# Patient Record
Sex: MALE | Race: WHITE | Employment: FULL TIME | URBAN - METROPOLITAN AREA
[De-identification: names, ages, dates, MRNs, and addresses within clinical notes are randomized per-mention and may not be internally consistent; named-entity substitution may affect disease eponyms.]

---

## 2019-07-18 ENCOUNTER — OFFICE VISIT (OUTPATIENT)
Dept: FAMILY MEDICINE | Facility: CLINIC | Age: 54
End: 2019-07-18

## 2019-07-18 VITALS
HEIGHT: 70 IN | TEMPERATURE: 98.2 F | DIASTOLIC BLOOD PRESSURE: 82 MMHG | WEIGHT: 147 LBS | SYSTOLIC BLOOD PRESSURE: 130 MMHG | BODY MASS INDEX: 21.05 KG/M2 | RESPIRATION RATE: 20 BRPM | HEART RATE: 86 BPM | OXYGEN SATURATION: 96 %

## 2019-07-18 DIAGNOSIS — R63.4 WEIGHT LOSS: ICD-10-CM

## 2019-07-18 DIAGNOSIS — Z71.89 ACP (ADVANCE CARE PLANNING): ICD-10-CM

## 2019-07-18 DIAGNOSIS — E11.9 TYPE 2 DIABETES MELLITUS WITHOUT COMPLICATION, WITHOUT LONG-TERM CURRENT USE OF INSULIN (H): ICD-10-CM

## 2019-07-18 DIAGNOSIS — G47.00 INSOMNIA, UNSPECIFIED TYPE: ICD-10-CM

## 2019-07-18 DIAGNOSIS — Z76.89 HEALTH CARE HOME: ICD-10-CM

## 2019-07-18 DIAGNOSIS — Z72.0 TOBACCO USE: ICD-10-CM

## 2019-07-18 DIAGNOSIS — Z01.818 PRE-OPERATIVE EXAMINATION: Primary | ICD-10-CM

## 2019-07-18 DIAGNOSIS — D21.11: ICD-10-CM

## 2019-07-18 DIAGNOSIS — G43.409 HEMIPLEGIC MIGRAINE WITHOUT STATUS MIGRAINOSUS, NOT INTRACTABLE: ICD-10-CM

## 2019-07-18 LAB
ALBUMIN SERPL-MCNC: 4.4 G/DL (ref 3.6–5.1)
ALBUMIN/GLOB SERPL: 1.6 {RATIO} (ref 1–2.5)
ALP SERPL-CCNC: 46 U/L (ref 33–130)
ALT 1742-6: 17 U/L (ref 5–30)
AST 1920-8: 15 U/L (ref 7–31)
BILIRUB SERPL-MCNC: 0.5 MG/DL (ref 0.2–1.2)
BUN SERPL-MCNC: 17 MG/DL (ref 7–25)
BUN/CREATININE RATIO: 17.7 (ref 6–22)
CALCIUM SERPL-MCNC: 9.7 MG/DL (ref 8.6–10.3)
CHLORIDE SERPLBLD-SCNC: 103.9 MMOL/L (ref 98–110)
CHOLEST SERPL-MCNC: 231 MG/DL (ref 0–199)
CHOLEST/HDLC SERPL: 4 {RATIO} (ref 0–5)
CO2 SERPL-SCNC: 24.5 MMOL/L (ref 20–32)
CREAT SERPL-MCNC: 0.96 MG/DL (ref 0.7–1.18)
ERYTHROCYTE [DISTWIDTH] IN BLOOD BY AUTOMATED COUNT: 12.1 %
GLOBULIN, CALCULATED - QUEST: 2.8 (ref 1.9–3.7)
GLUCOSE SERPL-MCNC: 143 MG/DL (ref 60–99)
HBA1C MFR BLD: 6.6 % (ref 4–7)
HCT VFR BLD AUTO: 49.7 % (ref 40–53)
HDLC SERPL-MCNC: 62 MG/DL (ref 40–150)
HEMOGLOBIN: 16.1 G/DL (ref 13.3–17.7)
LDLC SERPL CALC-MCNC: 155 MG/DL (ref 0–130)
MCH RBC QN AUTO: 32.7 PG (ref 26–33)
MCHC RBC AUTO-ENTMCNC: 32.4 G/DL (ref 31–36)
MCV RBC AUTO: 100.9 FL (ref 78–100)
PLATELET COUNT - QUEST: 282 10^9/L (ref 150–375)
POTASSIUM SERPL-SCNC: 4.21 MMOL/L (ref 3.5–5.3)
PROT SERPL-MCNC: 7.2 G/DL (ref 6.1–8.1)
RBC # BLD AUTO: 4.93 10*12/L (ref 4.4–5.9)
SODIUM SERPL-SCNC: 138.1 MMOL/L (ref 135–146)
TRIGL SERPL-MCNC: 72 MG/DL (ref 0–149)
WBC # BLD AUTO: 7.6 10*9/L (ref 4–11)

## 2019-07-18 PROCEDURE — 99203 OFFICE O/P NEW LOW 30 MIN: CPT | Performed by: PHYSICIAN ASSISTANT

## 2019-07-18 PROCEDURE — 93000 ELECTROCARDIOGRAM COMPLETE: CPT | Performed by: PHYSICIAN ASSISTANT

## 2019-07-18 PROCEDURE — 80050 GENERAL HEALTH PANEL: CPT | Mod: 90 | Performed by: PHYSICIAN ASSISTANT

## 2019-07-18 PROCEDURE — 87389 HIV-1 AG W/HIV-1&-2 AB AG IA: CPT | Mod: 90 | Performed by: PHYSICIAN ASSISTANT

## 2019-07-18 PROCEDURE — 36415 COLL VENOUS BLD VENIPUNCTURE: CPT | Performed by: PHYSICIAN ASSISTANT

## 2019-07-18 PROCEDURE — 80061 LIPID PANEL: CPT | Performed by: PHYSICIAN ASSISTANT

## 2019-07-18 PROCEDURE — 83036 HEMOGLOBIN GLYCOSYLATED A1C: CPT | Performed by: PHYSICIAN ASSISTANT

## 2019-07-18 SDOH — HEALTH STABILITY: MENTAL HEALTH: HOW OFTEN DO YOU HAVE A DRINK CONTAINING ALCOHOL?: NEVER

## 2019-07-18 ASSESSMENT — ANXIETY QUESTIONNAIRES
2. NOT BEING ABLE TO STOP OR CONTROL WORRYING: SEVERAL DAYS
3. WORRYING TOO MUCH ABOUT DIFFERENT THINGS: MORE THAN HALF THE DAYS
IF YOU CHECKED OFF ANY PROBLEMS ON THIS QUESTIONNAIRE, HOW DIFFICULT HAVE THESE PROBLEMS MADE IT FOR YOU TO DO YOUR WORK, TAKE CARE OF THINGS AT HOME, OR GET ALONG WITH OTHER PEOPLE: SOMEWHAT DIFFICULT
1. FEELING NERVOUS, ANXIOUS, OR ON EDGE: MORE THAN HALF THE DAYS
GAD7 TOTAL SCORE: 8
5. BEING SO RESTLESS THAT IT IS HARD TO SIT STILL: NOT AT ALL
7. FEELING AFRAID AS IF SOMETHING AWFUL MIGHT HAPPEN: NOT AT ALL
6. BECOMING EASILY ANNOYED OR IRRITABLE: NOT AT ALL

## 2019-07-18 ASSESSMENT — PATIENT HEALTH QUESTIONNAIRE - PHQ9
5. POOR APPETITE OR OVEREATING: NEARLY EVERY DAY
SUM OF ALL RESPONSES TO PHQ QUESTIONS 1-9: 12

## 2019-07-18 ASSESSMENT — MIFFLIN-ST. JEOR: SCORE: 1522.01

## 2019-07-18 NOTE — PROGRESS NOTES
[facility]  [address]  [address]  [facility] [address]  [phone]  Dept: [phone]    PRE-OP EVALUATION:  Today's date: 2019    Ruslan Cervantes (: 1965) presents for pre-operative evaluation assessment as requested by Dr. To.  He requires evaluation and anesthesia risk assessment prior to undergoing surgery/procedure for treatment of tumor growth in right hand ring finger.    Proposed Surgery/ Procedure: Tumor growth removal of right hand ring finger   Date of Surgery/ Procedure:   Time of Surgery/ Procedure: [facility]  Hospital/Surgical Facility: [facility] based on MRI results   Fax number for surgical facility: [facility]  Primary Physician: Evelia Perez  Type of Anesthesia Anticipated: Local, also could change based on MRI results    Patient has a Health Care Directive or Living Will:  NO    1. NO - Do you have a history of heart attack, stroke, stent, bypass or surgery on an artery in the head, neck, heart or legs?  2. NO - Do you ever have any pain or discomfort in your chest?  3. NO - Do you have a history of  Heart Failure?  4. NO - Are you troubled by shortness of breath when: walking on the level, up a slight hill or at night?  5. NO - Do you currently have a cold, bronchitis or other respiratory infection?  6. NO - Do you have a cough, shortness of breath or wheezing?  7. NO - Do you sometimes get pains in the calves of your legs when you walk?  8. NO - Do you or anyone in your family have previous history of blood clots?  9. NO - Do you or does anyone in your family have a serious bleeding problem such as prolonged bleeding following surgeries or cuts?  10. NO - Have you ever had problems with anemia or been told to take iron pills?  11. NO - Have you had any abnormal blood loss such as black, tarry or bloody stools, or abnormal vaginal bleeding?  12. NO - Have you ever had a blood transfusion?  13. NO - Have you or any of your relatives  ever had problems with anesthesia?  14. NO - Do you have sleep apnea, excessive snoring or daytime drowsiness?  15. NO - Do you have any prosthetic heart valves?  16. NO - Do you have prosthetic joints?  17. NO - Is there any chance that you may be pregnant?      HPI:     HPI related to upcoming procedure: Bipin is here today with a tumor on the ring finger of his right hand that has been present for several years. Just recently, the tumor has been getting larger, constant pain, and it is getting in the way of certain activities.     Diabetes: New diagnosis with pre-op exam work-up. Fortunately, A1c immediately controlled with initial A1c of 6.6%. Plan is for diet/lifestyle changes with recheck in 3 months.     See problem list for active medical problems.  See ROS for pertinent symptoms related to these conditions.      MEDICAL HISTORY:     Patient Active Problem List    Diagnosis Date Noted     ACP (advance care planning) 07/18/2019     Priority: Medium     Copy of healthcare directive was given to patient today to fill out and turn back in when finished so that we have on file.          Health Care Home 07/18/2019     Priority: Medium     Diabetes mellitus, type 2 (H) 07/18/2019     Priority: Medium      Past Medical History:   Diagnosis Date     Back injury 2016    broken L4, was broken for years     Past Surgical History:   Procedure Laterality Date     APPENDECTOMY  1983     BACK SURGERY N/A 10/17/2016    L4, L5 and S1 lumbar fusion surgery     HC OPEN TX NOSE FX+OPEN FIX SEPTUM  1983    deviated septum     HC TOOTH EXTRACTION W/FORCEP  1983    all wisdom teeth removed     HERNIA REPAIR  1975     No current outpatient medications on file.     OTC products: Took ibuprofen as recently as 1 week ago.     No Known Allergies     Latex Allergy: NO    Social History     Tobacco Use     Smoking status: Current Every Day Smoker     Packs/day: 1.00     Years: 25.00     Pack years: 25.00     Types: Cigarettes      "Smokeless tobacco: Never Used   Substance Use Topics     Alcohol use: Never     Frequency: Never     Comment: sobriety for 13 years     History   Drug Use     Types: Marijuana     Comment: uses for pain managemet for back pain       REVIEW OF SYSTEMS:   Constitutional, neuro, ENT, endocrine, pulmonary, cardiac, gastrointestinal, genitourinary, musculoskeletal, integument and psychiatric systems are negative, except as otherwise noted.    EXAM:   /82 (BP Location: Right arm, Patient Position: Sitting, Cuff Size: Adult Regular)   Pulse 86   Temp 98.2  F (36.8  C) (Oral)   Resp 20   Ht 1.784 m (5' 10.25\")   Wt 66.7 kg (147 lb)   SpO2 96%   BMI 20.94 kg/m      GENERAL APPEARANCE: healthy, alert and no distress     EYES: EOMI,  PERRL     HENT: ear canals and TM's normal and nose and mouth without ulcers or lesions     NECK: no adenopathy, no asymmetry, masses, or scars and thyroid normal to palpation     RESP: lungs clear to auscultation - no rales, rhonchi or wheezes     CV: regular rates and rhythm, normal S1 S2, no S3 or S4 and no murmur, click or rub     ABDOMEN:  soft, nontender, no HSM or masses and bowel sounds normal     MS: extremities normal- no gross deformities noted, no evidence of inflammation in joints, FROM in all extremities.     SKIN: no suspicious lesions or rashes     NEURO: Normal strength and tone, sensory exam grossly normal, mentation intact and speech normal     PSYCH: mentation appears normal. and affect normal/bright     LYMPHATICS: No cervical adenopathy    DIAGNOSTICS:   EKG (attached): sinus bradycardia, normal axis, normal intervals, no acute ST/T changes c/w ischemia, no LVH by voltage criteria, unchanged from previous tracings  Hemoglobin (indicated for history of anemia or procedure with significant blood loss such as tonsillectomy, major intraperitoneal surgery, vascular surgery, major spine surgery, total joint replacement)    Office Visit on 07/18/2019   Component Date " Value Ref Range Status     WBC 07/18/2019 7.6  4.0 - 11 10*9/L Final     RBC Count 07/18/2019 4.93  4.4 - 5.9 10*12/L Final     Hemoglobin 07/18/2019 16.1  13.3 - 17.7 g/dL Final     Hematocrit 07/18/2019 49.7  40.0 - 53.0 % Final     MCV 07/18/2019 100.9* 78 - 100 fL Final     MCH 07/18/2019 32.7  26 - 33 pg Final     MCHC 07/18/2019 32.4  31 - 36 g/dL Final     RDW 07/18/2019 12.1  % Final     Platelet Count 07/18/2019 282  150 - 375 10^9/L Final     TSH 07/18/2019 2.50  0.40 - 4.50 mIU/L Final     HIV 1/2 Agn Mary 4th Gen With Reflex 07/18/2019 NON-REACTIVE  NON-REACTIVE Final    Comment: HIV-1 antigen and HIV-1/HIV-2 antibodies were not  detected. There is no laboratory evidence of HIV  infection.     PLEASE NOTE: This information has been disclosed to  you from records whose confidentiality may be  protected by state law.  If your state requires such  protection, then the state law prohibits you from  making any further disclosure of the information  without the specific written consent of the person  to whom it pertains, or as otherwise permitted by law.  A general authorization for the release of medical or  other information is NOT sufficient for this purpose.      For additional information please refer to  http://education.[employer].BuyerMLS/faq/POW076  (This link is being provided for informational/  educational purposes only.)        The performance of this assay has not been clinically  validated in patients less than 2 years old.          Hemoglobin A1C 07/18/2019 6.6  4.0 - 7.0 % Final     Cholesterol 07/18/2019 231* 0 - 199 mg/dL Final     Triglycerides 07/18/2019 72  0 - 149 mg/dL Final     HDL Cholesterol 07/18/2019 62  40 - 150 mg/dL Final     LDL Cholesterol Direct 07/18/2019 155* 0 - 130 mg/dL Final     Cholesterol/HDL Ratio 07/18/2019 4  0 - 5 Final     Carbon Dioxide 07/18/2019 24.5  20 - 32 mmol/L Final     Creatinine 07/18/2019 0.96  0.70 - 1.18 mg/dL Final     Glucose 07/18/2019 143* 60 -  99 mg/dL Final     Sodium 07/18/2019 138.1  135 - 146 mmol/L Final     Potassium 07/18/2019 4.21  3.5 - 5.3 mmol/L Final     Chloride 07/18/2019 103.9  98 - 110 mmol/L Final     Protein Total 07/18/2019 7.2  6.1 - 8.1 g/dL Final     Albumin 07/18/2019 4.4  3.6 - 5.1 g/dL Final     Alkaline Phosphatase 07/18/2019 46  33 - 130 U/L Final     ALT 07/18/2019 17  5 - 30 U/L Final     AST 07/18/2019 15  7 - 31 U/L Final     Bilirubin Total 07/18/2019 0.5  0.2 - 1.2 mg/dL Final     Urea Nitrogen 07/18/2019 17  7 - 25 mg/dL Final     Calcium 07/18/2019 9.7  8.6 - 10.3 mg/dL Final     BUN/Creatinine Ratio 07/18/2019 17.7  6 - 22 Final     Globulin Calculated 07/18/2019 2.8  1.9 - 3.7 Final     A/G Ratio 07/18/2019 1.6  1 - 2.5 Final     No immediate concerns with lab findings, including new dx DM2.    IMPRESSION:   Reason for surgery/procedure: Right hand, ring finger tumor.  Diagnosis/reason for consult: Pre-operative examination.    The proposed surgical procedure is considered INTERMEDIATE risk.     REVISED CARDIAC RISK INDEX  The patient has the following serious cardiovascular risks for perioperative complications such as (MI, PE, VFib and 3  AV Block):  No serious cardiac risks  INTERPRETATION: 1 risks: Class II (low risk - 0.9% complication rate)    The patient has the following additional risks for perioperative complications:  No identified additional risks      ICD-10-CM    1. Pre-operative examination Z01.818 VENOUS COLLECTION     HEMOGRAM/PLATELET ([facility])     EKG 12-lead complete w/read - Clinics     CANCELED: Basic Metabolic Panel ([facility])   2. 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 Insomnia, unspecified type G47.00    4. Tobacco use Z72.0    5. Weight loss R63.4 VENOUS COLLECTION     TSH with free T4 reflex ([facility])     HIV 1/2 Agn Mary 4th Gen w Reflex  ([facility])     Hemoglobin A1c ([facility])   6. Hemiplegic migraine without status migrainosus, not intractable G43.409    7. ACP (advance care planning) Z71.89    8. Health Care Home Z76.89    9. Type 2 diabetes mellitus without complication, without long-term current use of insulin (H) E11.9 Lipid Panel ([facility])     Comprehensive Metobolic Panel ([facility])       RECOMMENDATIONS:     --Patient is to take all scheduled medications on the day of surgery EXCEPT for modifications listed below.    APPROVAL GIVEN to proceed with proposed procedure, without further diagnostic evaluation     1. Seven days before surgery do not take Aspirin or any over-the-counter pain medications other than Tylenol.  TYLENOL is the safest pain pill to use before surgery because it does not affect your bleeding time. If tylenol is not sufficient for pain control talk to me or the surgeon and we will decide what is safe to use.    2. Do not eat anything after midnight  (kathleen of the surgery) and nothing the morning of the surgery.    3. Medications: Does not take any daily medications.      4. Follow all instructions given by the surgery team. They usually give out a packet. Read it and please follow it precisely. This helps surgical experience and outcomes.    5. If you have any questions do not hesitate to call me or the surgeon/surgical team.      Evelia Perez PA-C  [facility]          Signed Electronically by: Evelia Perez PA-C    Copy of this evaluation report is provided to requesting physician.    [facility] Preop Guidelines    Revised Cardiac Risk Index

## 2019-07-18 NOTE — NURSING NOTE
Chief Complaint   Patient presents with     Pre-Op Exam     DOS 7/22, right ring finger tumor removal, Dr. To, [facility]

## 2019-07-19 ENCOUNTER — TELEPHONE (OUTPATIENT)
Dept: FAMILY MEDICINE | Facility: CLINIC | Age: 54
End: 2019-07-19

## 2019-07-19 LAB
HIV 1/2 AGN ABY 4TH GEN WITH REFLEX: NORMAL
TSH SERPL-ACNC: 2.5 MIU/L (ref 0.4–4.5)

## 2019-07-19 ASSESSMENT — ANXIETY QUESTIONNAIRES: GAD7 TOTAL SCORE: 8

## 2019-07-31 ENCOUNTER — HOSPITAL PATHOLOGY (OUTPATIENT)
Dept: OTHER | Facility: CLINIC | Age: 54
End: 2019-07-31

## 2019-08-02 LAB — COPATH REPORT: NORMAL

## 2019-08-15 ENCOUNTER — OFFICE VISIT (OUTPATIENT)
Dept: FAMILY MEDICINE | Facility: CLINIC | Age: 54
End: 2019-08-15

## 2019-08-15 VITALS
OXYGEN SATURATION: 98 % | DIASTOLIC BLOOD PRESSURE: 64 MMHG | HEART RATE: 82 BPM | WEIGHT: 149 LBS | HEIGHT: 70 IN | SYSTOLIC BLOOD PRESSURE: 118 MMHG | BODY MASS INDEX: 21.33 KG/M2

## 2019-08-15 DIAGNOSIS — F10.21 HISTORY OF ALCOHOLISM (H): ICD-10-CM

## 2019-08-15 DIAGNOSIS — Z12.11 SPECIAL SCREENING FOR MALIGNANT NEOPLASMS, COLON: ICD-10-CM

## 2019-08-15 DIAGNOSIS — N52.9 ERECTILE DYSFUNCTION, UNSPECIFIED ERECTILE DYSFUNCTION TYPE: ICD-10-CM

## 2019-08-15 DIAGNOSIS — E11.9 TYPE 2 DIABETES MELLITUS WITHOUT COMPLICATION, WITHOUT LONG-TERM CURRENT USE OF INSULIN (H): Primary | ICD-10-CM

## 2019-08-15 PROCEDURE — 90715 TDAP VACCINE 7 YRS/> IM: CPT | Performed by: PHYSICIAN ASSISTANT

## 2019-08-15 PROCEDURE — 99213 OFFICE O/P EST LOW 20 MIN: CPT | Mod: 25 | Performed by: PHYSICIAN ASSISTANT

## 2019-08-15 PROCEDURE — 90471 IMMUNIZATION ADMIN: CPT | Performed by: PHYSICIAN ASSISTANT

## 2019-08-15 RX ORDER — METFORMIN HCL 500 MG
500 TABLET, EXTENDED RELEASE 24 HR ORAL
Qty: 30 TABLET | Refills: 2 | Status: SHIPPED | OUTPATIENT
Start: 2019-08-15

## 2019-08-15 RX ORDER — LISINOPRIL 5 MG/1
5 TABLET ORAL DAILY
Qty: 30 TABLET | Refills: 2 | Status: SHIPPED | OUTPATIENT
Start: 2019-08-15

## 2019-08-15 RX ORDER — HYDROCODONE BITARTRATE AND ACETAMINOPHEN 5; 325 MG/1; MG/1
TABLET ORAL
Refills: 0 | COMMUNITY
Start: 2019-07-31 | End: 2019-09-26

## 2019-08-15 RX ORDER — SILDENAFIL 25 MG/1
TABLET, FILM COATED ORAL
Qty: 20 TABLET | Refills: 1 | Status: SHIPPED | OUTPATIENT
Start: 2019-08-15 | End: 2019-08-15

## 2019-08-15 RX ORDER — ATORVASTATIN CALCIUM 10 MG/1
10 TABLET, FILM COATED ORAL DAILY
Qty: 30 TABLET | Refills: 2 | Status: SHIPPED | OUTPATIENT
Start: 2019-08-15

## 2019-08-15 RX ORDER — SILDENAFIL 25 MG/1
TABLET, FILM COATED ORAL
Qty: 20 TABLET | Refills: 1 | Status: SHIPPED | OUTPATIENT
Start: 2019-08-15

## 2019-08-15 ASSESSMENT — PATIENT HEALTH QUESTIONNAIRE - PHQ9
5. POOR APPETITE OR OVEREATING: NEARLY EVERY DAY
SUM OF ALL RESPONSES TO PHQ QUESTIONS 1-9: 19

## 2019-08-15 ASSESSMENT — ANXIETY QUESTIONNAIRES
GAD7 TOTAL SCORE: 19
5. BEING SO RESTLESS THAT IT IS HARD TO SIT STILL: MORE THAN HALF THE DAYS
2. NOT BEING ABLE TO STOP OR CONTROL WORRYING: NEARLY EVERY DAY
7. FEELING AFRAID AS IF SOMETHING AWFUL MIGHT HAPPEN: MORE THAN HALF THE DAYS
1. FEELING NERVOUS, ANXIOUS, OR ON EDGE: NEARLY EVERY DAY
6. BECOMING EASILY ANNOYED OR IRRITABLE: NEARLY EVERY DAY
IF YOU CHECKED OFF ANY PROBLEMS ON THIS QUESTIONNAIRE, HOW DIFFICULT HAVE THESE PROBLEMS MADE IT FOR YOU TO DO YOUR WORK, TAKE CARE OF THINGS AT HOME, OR GET ALONG WITH OTHER PEOPLE: EXTREMELY DIFFICULT
3. WORRYING TOO MUCH ABOUT DIFFERENT THINGS: NEARLY EVERY DAY

## 2019-08-15 ASSESSMENT — MIFFLIN-ST. JEOR: SCORE: 1531.08

## 2019-08-15 NOTE — NURSING NOTE
Bipin is here for recheck meds and go over lab work.          Pre-visit Screening:  Immunizations:  up to date  Colonoscopy:  is due and to be scheduled by patient for later completion  Mammogram: NA  Asthma Action Test/Plan:  NA  PHQ9:  Done today  GAD7:  Done today  Questioned patient about current smoking habits Pt. smokes 20 cigerettes a day  Ok to leave detailed message on voice mail for today's visit only Yes, phone # [phone]

## 2019-08-15 NOTE — PATIENT INSTRUCTIONS
New diagnosis diabetes.   Recommended start 1 tablet of metformin with dinner for 1 week. Monitor for side effects (upset stomach, nausea,   If doing okay on this, at that time start taking lisinopril (kidney protective) once daily for 1 week. Monitor for side effects (cough).  If doing okay on this, at that time, start taking atorvastatin 1 tablet daily at bedtime. Monitor for side effects (muscle aches, joint pains).     For mental health, I am concerned that you have both anxiety and depression. Most importantly, you are safe- not having any thoughts of better off not here or worried that you would hurt yourself or others. I would like to have you pursue meeting with a therapist, then return in 1 month after starting the above medications, and at that time we could consider adding a mental health medication. However, if things worsen in the meantime, I want to see you immediately, or contact ER.     [facility], [place] [address] [address]  [address]  [phone]    Counseling Care  [address] [address]  [phone] -- appt line    [facility]  [address] [address]  [phone]  [email or web address]    For left arm/hand symptoms (pain, cramping, numbness)- pay attention to whether it involves all your finger, or if doesn't involve your pinky. We can discuss this more at a later.    ED- try [facility] for cost savings

## 2019-08-15 NOTE — PROGRESS NOTES
"CC: New dx diabetes, mental health, several concerns.    History:  Bipin is here today to follow-up as he was found to have an elevated A1c of 6.6% at new patient pre-op 7/31/2019. He was not started on any medications at that time, as I didn't want to risk any changes prior to his surgery.    Now he returns wanting to address this, as well as several other issues he would like to discuss. Warned him that we will not be able to address all issues, given this was a 15 minute that was planned to be spent discussing new dx diabetes.     DM2:  Not taking any medications yet, but has been being much more careful of sugar/carbohydrate intake.     Depression/anxiety: Bipin also brings up his feeings of low mood, but fortunately no SI/HI. His mother has a history of depression, and he tends to have similar health things to her. He also has anxiety symptoms. Is having trouble sleeping where he sleeps 2-4 hours per night. Has tried herbal tea. Fortunately, no SI/HI, feels safe.     ED: Has struggled with symptoms of ED for 3-5 years. Would like to try medication for this.    Yellow toe nails: Has tried over the counter solution that helped somewhat, but now can't find it anywhere.    Left arm, forearm, hand pain/spasm    PMH, MEDICATIONS, ALLERGIES, SOCIAL AND FAMILY HISTORY in EPIC and reviewed by me personally.    ROS negative other than the symptoms noted above in the HPI.      Examination   /64 (BP Location: Right arm, Patient Position: Sitting, Cuff Size: Adult Regular)   Pulse 82   Ht 1.784 m (5' 10.25\")   Wt 67.6 kg (149 lb)   SpO2 98%   BMI 21.23 kg/m         Constitutional: Sitting comfortably, in no acute distress. Vital signs noted  Eyes: pupils equal round reactive to light and accomodation, extra ocular movements intact  Neck:  no adenopathy, trachea midline and normal to palpation  Cardiovascular:  regular rate and rhythm, no murmurs, clicks, or gallops  Respiratory:  normal respiratory rate and " rhythm, lungs clear to auscultation  SKIN: No jaundice/pallor/rash.   Psychiatric: mentation appears normal and affect normal/bright        A/P    ICD-10-CM    1. Type 2 diabetes mellitus without complication, without long-term current use of insulin (H) E11.9 C FOOT EXAM  NO CHARGE     metFORMIN (GLUCOPHAGE-XR) 500 MG 24 hr tablet     atorvastatin (LIPITOR) 10 MG tablet     lisinopril (PRINIVIL/ZESTRIL) 5 MG tablet   2. Special screening for malignant neoplasms, colon Z12.11 GASTROENTEROLOGY ADULT REF PROCEDURE ONLY Other; [place] [facility] [phone]   3. History of alcoholism (H) F10.21    4. Erectile dysfunction, unspecified erectile dysfunction type N52.9 sildenafil (VIAGRA) 25 MG tablet     DISCONTINUED: sildenafil (VIAGRA) 25 MG tablet       DISCUSSION:  New diagnosis diabetes.   Recommended start 1 tablet of metformin with dinner for 1 week. Monitor for side effects (upset stomach, nausea,   If doing okay on this, at that time start taking lisinopril (kidney protective) once daily for 1 week. Monitor for side effects (cough).  If doing okay on this, at that time, start taking atorvastatin 1 tablet daily at bedtime. Monitor for side effects (muscle aches, joint pains). Return in 2-3 months to recheck A1c. Will do microalbuminuria and foot exam at follow-up appt. Will also discuss glucometer at that time.    Anxiety and depression: PARAM-7 and PHQ-9 both suggest PARAM and major depressive disorder. Forunately, I feel that he is safe without no SI/HI, and he denies that he would ever hurt himself. I would like to have you pursue meeting with a therapist, then return in 1 month after starting the above medications, and at that time we could consider adding a mental health medication. However, if things worsen in the meantime, I want to see you immediately, or contact [facility].     [facility]  [address] [address]  [address] [phone]    Counseling [address]  [address]  [place] [address]  [phone] -- appt line    [facility]  [address] [address]  [phone]  [email or web address]    For left arm/hand symptoms (pain, cramping, numbness)- pay attention to whether it involves all your finger, or if doesn't involve your pinky. We can discuss this more at a later.    ED- try [employer] for cost savings    Toe-nails- non urgent. Did not address.    follow up visit: 1 month, sooner if worsening    Evelia Perez PA-C  [facility]

## 2019-08-16 ASSESSMENT — ANXIETY QUESTIONNAIRES: GAD7 TOTAL SCORE: 19

## 2019-09-26 ENCOUNTER — OFFICE VISIT (OUTPATIENT)
Dept: FAMILY MEDICINE | Facility: CLINIC | Age: 54
End: 2019-09-26

## 2019-09-26 VITALS
DIASTOLIC BLOOD PRESSURE: 64 MMHG | WEIGHT: 147.6 LBS | BODY MASS INDEX: 21.13 KG/M2 | TEMPERATURE: 97.9 F | HEART RATE: 80 BPM | HEIGHT: 70 IN | OXYGEN SATURATION: 97 % | SYSTOLIC BLOOD PRESSURE: 122 MMHG

## 2019-09-26 DIAGNOSIS — B35.1 ONYCHOMYCOSIS: Primary | ICD-10-CM

## 2019-09-26 DIAGNOSIS — R41.840 POOR CONCENTRATION: ICD-10-CM

## 2019-09-26 PROCEDURE — 99213 OFFICE O/P EST LOW 20 MIN: CPT | Mod: 25 | Performed by: PHYSICIAN ASSISTANT

## 2019-09-26 PROCEDURE — 90686 IIV4 VACC NO PRSV 0.5 ML IM: CPT | Performed by: PHYSICIAN ASSISTANT

## 2019-09-26 PROCEDURE — 90471 IMMUNIZATION ADMIN: CPT | Performed by: PHYSICIAN ASSISTANT

## 2019-09-26 RX ORDER — CICLOPIROX 80 MG/ML
SOLUTION TOPICAL
Qty: 6.6 ML | Refills: 1 | Status: SHIPPED | OUTPATIENT
Start: 2019-09-26

## 2019-09-26 RX ORDER — BUPROPION HYDROCHLORIDE 150 MG/1
150 TABLET ORAL EVERY MORNING
Qty: 30 TABLET | Refills: 1 | Status: SHIPPED | OUTPATIENT
Start: 2019-09-26

## 2019-09-26 ASSESSMENT — MIFFLIN-ST. JEOR: SCORE: 1524.73

## 2019-09-26 NOTE — NURSING NOTE
Bipin is here today for a few issues including recently not feeling well, nails cracking, and behavioral issues.    Pre-visit Screening:  Immunizations:  up to date  Colonoscopy:  is up to date  Mammogram: NA  Asthma Action Test/Plan:  NA  PHQ9:  NA  GAD7:  NA  Questioned patient about current smoking habits Pt. Recently quit.  Ok to leave detailed message on voice mail for today's visit only Yes, phone # [phone]

## 2019-09-26 NOTE — PATIENT INSTRUCTIONS
Recommended prescription to treat toenail fungus. It is called ciclopirox or Penlac. Every night you would apply this to all of your toenails, and to the skin 1-2 mm around the nailbeds. You would also want to trim the toenails regularly. On one day per week, usually patients choose Sunday, you would take an alcohol swab or cotton ball with rubbing alcohol and remove all the build up of the laquer before applying a new layer. This can take up to 12 weeks of consistent use. If you have not seen any progress/improvement within 4 weeks, we may want to consider other options.    Given concentration problems, will refer for psych testing. In the meantime, will do trial of Wellbutrin. Pt has been on this before and tolerated, but it was being used for smoking cessation. Take 1 pill daily (AM OR PM), and monitor closely for side effects, and contact me if noted. Return in 1-2 months to recheck.

## 2019-09-26 NOTE — PROGRESS NOTES
"CC: Chills and sweats    History:  Bipin has been concerned about nail cracking, and nails turn yellows   Headaches are happening more frequently.     Bipin is mainly concened today about performance concerns he is having at work. His boss has been showing him video footage of how he is inefficient at completing tasks at work. He would like to be tested for ADHD. He feels like he has always struggled with this.     Bipin started getting chill and sweats that started Tuesday. However, this has resolved.     Since our last appointment started attending AA meetings. Continues to be sober.    Colonoscopy scheduled for next week.     PMH, MEDICATIONS, ALLERGIES, SOCIAL AND FAMILY HISTORY in [facility] and reviewed by me personally.    ROS negative other than the symptoms noted above in the HPI.      Examination   /64 (BP Location: Right arm, Patient Position: Sitting, Cuff Size: Adult Large)   Pulse 80   Temp 97.9  F (36.6  C) (Oral)   Ht 1.784 m (5' 10.25\")   Wt 67 kg (147 lb 9.6 oz)   SpO2 97%   BMI 21.03 kg/m       Constitutional: Sitting comfortably, in no acute distress. Vital signs noted  Neck:  no adenopathy, trachea midline and normal to palpation  Cardiovascular:  regular rate and rhythm, no murmurs, clicks, or gallops  Respiratory:  normal respiratory rate and rhythm, lungs clear to auscultation  SKIN: No jaundice/pallor/rash. Mild thickening and flaking of several fingernails and toenails. Mild yellow discoloration of several toenails.   Psychiatric: mentation appears normal and affect normal/bright        A/P    ICD-10-CM    1. Onychomycosis B35.1 ciclopirox (PENLAC) 8 % external solution   2. Poor concentration R41.840 buPROPion (WELLBUTRIN XL) 150 MG 24 hr tablet     MENTAL HEALTH REFERRAL  - Adult; Assessments and Testing; ADHD; Other: Not Listed - Enter Referral Details in Scheduling Comments Below        DISCUSSION:  Recommended prescription to treat toenail fungus. It is called ciclopirox or " Penlac. Every night you would apply this to all of your toenails, and to the skin 1-2 mm around the nailbeds. You would also want to trim the toenails regularly. On one day per week, usually patients choose Sunday, you would take an alcohol swab or cotton ball with rubbing alcohol and remove all the build up of the laquer before applying a new layer. This can take up to 12 weeks of consistent use. If you have not seen any progress/improvement within 4 weeks, we may want to consider other options.    Given concentration problems, will refer for psych testing. In the meantime, will do trial of Wellbutrin. Pt has been on this before and tolerated, but it was being used for smoking cessation. No FH of seizures. Take 1 pill daily (AM OR PM), and monitor closely for side effects, and contact me if noted. Return in 1-2 months to recheck.     follow up visit: 1 month, due for med review    Evelia Perez PA-C  [facility]

## 2019-10-09 ENCOUNTER — APPOINTMENT (OUTPATIENT)
Dept: CT IMAGING | Facility: CLINIC | Age: 54
End: 2019-10-09
Attending: EMERGENCY MEDICINE
Payer: COMMERCIAL

## 2019-10-09 ENCOUNTER — HOSPITAL ENCOUNTER (EMERGENCY)
Facility: CLINIC | Age: 54
Discharge: HOME | End: 2019-10-09
Attending: NURSE PRACTITIONER | Admitting: NURSE PRACTITIONER
Payer: COMMERCIAL

## 2019-10-09 VITALS
BODY MASS INDEX: 20.94 KG/M2 | WEIGHT: 147 LBS | RESPIRATION RATE: 16 BRPM | HEART RATE: 59 BPM | OXYGEN SATURATION: 97 % | SYSTOLIC BLOOD PRESSURE: 119 MMHG | TEMPERATURE: 97.5 F | DIASTOLIC BLOOD PRESSURE: 76 MMHG

## 2019-10-09 DIAGNOSIS — S39.012A STRAIN OF LUMBAR REGION, INITIAL ENCOUNTER: ICD-10-CM

## 2019-10-09 DIAGNOSIS — S00.03XA CONTUSION OF SCALP, INITIAL ENCOUNTER: ICD-10-CM

## 2019-10-09 DIAGNOSIS — S16.1XXA STRAIN OF NECK MUSCLE, INITIAL ENCOUNTER: ICD-10-CM

## 2019-10-09 LAB
ANION GAP SERPL CALCULATED.3IONS-SCNC: 3 MMOL/L (ref 3–14)
BASOPHILS # BLD AUTO: 0.1 10E9/L (ref 0–0.2)
BASOPHILS NFR BLD AUTO: 0.7 %
BUN SERPL-MCNC: 16 MG/DL (ref 7–30)
CALCIUM SERPL-MCNC: 8.7 MG/DL (ref 8.5–10.1)
CHLORIDE SERPL-SCNC: 106 MMOL/L (ref 94–109)
CK SERPL-CCNC: 234 U/L (ref 30–300)
CO2 SERPL-SCNC: 30 MMOL/L (ref 20–32)
CREAT SERPL-MCNC: 0.87 MG/DL (ref 0.66–1.25)
DIFFERENTIAL METHOD BLD: ABNORMAL
EOSINOPHIL # BLD AUTO: 0.1 10E9/L (ref 0–0.7)
EOSINOPHIL NFR BLD AUTO: 1.9 %
ERYTHROCYTE [DISTWIDTH] IN BLOOD BY AUTOMATED COUNT: 11.9 % (ref 10–15)
ETHANOL SERPL-MCNC: <0.01 G/DL
GFR SERPL CREATININE-BSD FRML MDRD: >90 ML/MIN/{1.73_M2}
GLUCOSE SERPL-MCNC: 122 MG/DL (ref 70–99)
HCT VFR BLD AUTO: 41.5 % (ref 40–53)
HGB BLD-MCNC: 13.7 G/DL (ref 13.3–17.7)
IMM GRANULOCYTES # BLD: 0 10E9/L (ref 0–0.4)
IMM GRANULOCYTES NFR BLD: 0.3 %
LYMPHOCYTES # BLD AUTO: 2.5 10E9/L (ref 0.8–5.3)
LYMPHOCYTES NFR BLD AUTO: 36.4 %
MCH RBC QN AUTO: 33.1 PG (ref 26.5–33)
MCHC RBC AUTO-ENTMCNC: 33 G/DL (ref 31.5–36.5)
MCV RBC AUTO: 100 FL (ref 78–100)
MONOCYTES # BLD AUTO: 0.6 10E9/L (ref 0–1.3)
MONOCYTES NFR BLD AUTO: 8.3 %
NEUTROPHILS # BLD AUTO: 3.6 10E9/L (ref 1.6–8.3)
NEUTROPHILS NFR BLD AUTO: 52.4 %
NRBC # BLD AUTO: 0 10*3/UL
NRBC BLD AUTO-RTO: 0 /100
PLATELET # BLD AUTO: 253 10E9/L (ref 150–450)
POTASSIUM SERPL-SCNC: 4 MMOL/L (ref 3.4–5.3)
RBC # BLD AUTO: 4.14 10E12/L (ref 4.4–5.9)
SODIUM SERPL-SCNC: 139 MMOL/L (ref 133–144)
WBC # BLD AUTO: 6.9 10E9/L (ref 4–11)

## 2019-10-09 PROCEDURE — 25000128 H RX IP 250 OP 636: Performed by: EMERGENCY MEDICINE

## 2019-10-09 PROCEDURE — 85025 COMPLETE CBC W/AUTO DIFF WBC: CPT | Performed by: EMERGENCY MEDICINE

## 2019-10-09 PROCEDURE — 96374 THER/PROPH/DIAG INJ IV PUSH: CPT

## 2019-10-09 PROCEDURE — 70450 CT HEAD/BRAIN W/O DYE: CPT

## 2019-10-09 PROCEDURE — 74177 CT ABD & PELVIS W/CONTRAST: CPT

## 2019-10-09 PROCEDURE — 25000132 ZZH RX MED GY IP 250 OP 250 PS 637: Performed by: EMERGENCY MEDICINE

## 2019-10-09 PROCEDURE — 99285 EMERGENCY DEPT VISIT HI MDM: CPT | Mod: 25

## 2019-10-09 PROCEDURE — 80048 BASIC METABOLIC PNL TOTAL CA: CPT | Performed by: EMERGENCY MEDICINE

## 2019-10-09 PROCEDURE — 71260 CT THORAX DX C+: CPT

## 2019-10-09 PROCEDURE — 80320 DRUG SCREEN QUANTALCOHOLS: CPT | Performed by: EMERGENCY MEDICINE

## 2019-10-09 PROCEDURE — 82550 ASSAY OF CK (CPK): CPT | Performed by: EMERGENCY MEDICINE

## 2019-10-09 PROCEDURE — 72125 CT NECK SPINE W/O DYE: CPT

## 2019-10-09 PROCEDURE — 25000125 ZZHC RX 250: Performed by: EMERGENCY MEDICINE

## 2019-10-09 PROCEDURE — 96375 TX/PRO/DX INJ NEW DRUG ADDON: CPT

## 2019-10-09 PROCEDURE — 72128 CT CHEST SPINE W/O DYE: CPT

## 2019-10-09 PROCEDURE — 72131 CT LUMBAR SPINE W/O DYE: CPT

## 2019-10-09 RX ORDER — KETOROLAC TROMETHAMINE 15 MG/ML
15 INJECTION, SOLUTION INTRAMUSCULAR; INTRAVENOUS ONCE
Status: COMPLETED | OUTPATIENT
Start: 2019-10-09 | End: 2019-10-09

## 2019-10-09 RX ORDER — METHOCARBAMOL 750 MG/1
750 TABLET, FILM COATED ORAL 3 TIMES DAILY PRN
Qty: 30 TABLET | Refills: 0 | Status: SHIPPED | OUTPATIENT
Start: 2019-10-09

## 2019-10-09 RX ORDER — HYDROCODONE BITARTRATE AND ACETAMINOPHEN 5; 325 MG/1; MG/1
1-2 TABLET ORAL EVERY 6 HOURS PRN
Qty: 10 TABLET | Refills: 0 | Status: SHIPPED | OUTPATIENT
Start: 2019-10-09 | End: 2019-10-12

## 2019-10-09 RX ORDER — FENTANYL CITRATE 50 UG/ML
50 INJECTION, SOLUTION INTRAMUSCULAR; INTRAVENOUS
Status: DISCONTINUED | OUTPATIENT
Start: 2019-10-09 | End: 2019-10-09 | Stop reason: HOSPADM

## 2019-10-09 RX ORDER — IOPAMIDOL 755 MG/ML
500 INJECTION, SOLUTION INTRAVASCULAR ONCE
Status: COMPLETED | OUTPATIENT
Start: 2019-10-09 | End: 2019-10-09

## 2019-10-09 RX ORDER — METHOCARBAMOL 750 MG/1
750 TABLET, FILM COATED ORAL ONCE
Status: COMPLETED | OUTPATIENT
Start: 2019-10-09 | End: 2019-10-09

## 2019-10-09 RX ADMIN — SODIUM CHLORIDE 59 ML: 900 INJECTION INTRAVENOUS at 19:33

## 2019-10-09 RX ADMIN — IOPAMIDOL 74 ML: 755 INJECTION, SOLUTION INTRAVENOUS at 19:33

## 2019-10-09 RX ADMIN — FENTANYL CITRATE 50 MCG: 50 INJECTION INTRAMUSCULAR; INTRAVENOUS at 19:01

## 2019-10-09 RX ADMIN — METHOCARBAMOL TABLETS 750 MG: 750 TABLET, COATED ORAL at 21:12

## 2019-10-09 RX ADMIN — KETOROLAC TROMETHAMINE 15 MG: 15 INJECTION, SOLUTION INTRAMUSCULAR; INTRAVENOUS at 21:12

## 2019-10-09 ASSESSMENT — ENCOUNTER SYMPTOMS
NECK PAIN: 1
BACK PAIN: 1
ARTHRALGIAS: 1

## 2019-10-09 NOTE — ED AVS SNAPSHOT
[facility]  [address]  [facility] [address]  Phone:  [phone]  Fax:  [phone]                                    Ruslan Cervantes   MRN: [id number]    Department:  [facility]   Date of Visit:  10/9/2019           After Visit Summary Signature Page    I have received my discharge instructions, and my questions have been answered. I have discussed any challenges I see with this plan with the nurse or doctor.    ..........................................................................................................................................  Patient/Patient Representative Signature      ..........................................................................................................................................  Patient Representative Print Name and Relationship to Patient    ..................................................               ................................................  Date                                   Time    ..........................................................................................................................................  Reviewed by Signature/Title    ...................................................              ..............................................  Date                                               Time          22EPIC Rev 08/18

## 2019-10-09 NOTE — LETTER
October 9, 2019      To Whom It May Concern:      Ruslan Cervantes was seen in our Emergency Department today, 10/09/19.  I expect his condition to improve over the next 2-3 days.  He may return to work when improved.    Sincerely,        Kendra Jane MD

## 2019-10-09 NOTE — ED NOTES
Bed: ED27  Expected date: 10/9/19  Expected time: 6:27 PM  Means of arrival: Ambulance  Comments:  Edd Mclean

## 2019-10-09 NOTE — ED TRIAGE NOTES
Pt states he got home from [facility] and slipped down 6 steps which were carpeted but landed on tile. States it happened around 3:30pm and was down until after 6pm when family found him. C/o neck pain, lower back pain and right shoulder pain. Got 1.5mg Dilaudid and 4mg Zofran IV per EMS PTA. ABC's intact, alert and oriented X3.

## 2019-10-10 NOTE — ED PROVIDER NOTES
History     Chief Complaint:  Fall    HPI   Ruslan Cervantes is a 54 year old male who presents with a fall. The patient states that he slipped down 6 carpeted stairs and landed on tile around 1530. The patient was down until 1800 when family found him. The patient reports neck and low back pain as well as right shoulder pain. The patient received 1.5 mg of Dilaudid and 4 mg of Zofran prior to arrival.     Allergies:  No Known Allergies     Medications:    Lipitor  Wellbutrin  metformin  Lisinopril    Viagra     Past Medical History:    Diabetes  Hypertension  Depression    Past Surgical History:    appendectomy  Back surgery   Nose fracture   Hernia repair    Family History:    Depression- mother     Social History:  The patient was accompanied to the ED by family.  Smoking Status: Former smoker  Smokeless Tobacco: Never Used  Alcohol Use: Positive  Drug use: marijuana   Marital Status:  Single       Review of Systems   Musculoskeletal: Positive for arthralgias, back pain and neck pain.   All other systems reviewed and are negative.      Physical Exam     Patient Vitals for the past 24 hrs:   BP Temp Temp src Pulse Heart Rate Resp SpO2 Weight   10/09/19 1905 124/78 -- -- 57 -- -- 99 % --   10/09/19 1846 123/81 97.5  F (36.4  C) Oral -- -- -- -- --   10/09/19 1841 -- -- -- 63 63 16 96 % 66.7 kg (147 lb)     Physical Exam  Gen: alert  HEENT: PERRL, oropharynx clear, no intraoral laceration or dental trauma, no mandibular tenderness, no trismus  Ears: TM's normal bilaterally  Neck: C-collar in place on arrival, following neg CT cervical spine Full AROM, no paraspinous tenderness, no midline tenderness  CV: RRR, no murmurs, 2+ pulses in all extremities  Chest wall: no crepitus, no tenderness  Pulm: breath sounds equal, lungs clear  Abd: Soft, no tenderness  Back: + thoracic midline tenderness, + lumbar midline tenderness, + paraspinous tenderness  RUE: no tenderness, full AROM  LUE: no tenderness, full AROM  RLE: no  tenderness, full AROM  LLE: no tenderness, full AROM  Skin: No laceration  Neuro: GCS 15, moves all extremities without focal weakness, sensation grossly intact over all distal extremities, no facial droop, PERRL, EOMI    Emergency Department Course     Imaging:  Radiology findings were communicated with the patient who voiced understanding of the findings.    Lumbar Spine CT:  1.  No instrumentation loosening or failure.  2.  No high-grade spinal canal or neural foraminal stenosis   3.  Negative for bone fracture. Negative for traumatic malalignment.  Reading per radiology    CT Thoracic Spine:  1.  Negative for fracture or traumatic malalignment.  Reading per radiology    Cervical Spine CT:  1.  No fracture or posttraumatic subluxation.  2.  C6-C7 spondylosis.  Reading per radiology    CT Chest/ abdomen:  1.  No evidence for traumatic injury in the chest, abdomen, or pelvis.  Reading per radiology    Laboratory:  Laboratory findings were communicated with the patient who voiced understanding of the findings.    CBC: WBC 6.9, HGB 13.7,   BMP: glucose 122 (H) o/w WNL (Creatinine 0.87)    Alcohol level blood: <0.01    CK total: 234    Interventions:  1901 fentanyl 50 mcg IV  2112 Toradol 15 mg IV  2112 Robaxin 750 mg Oral     Emergency Department Course:     Nursing notes and vitals reviewed.    1830 I performed an exam of the patient as documented above.     1906 IV was inserted and blood was drawn for laboratory testing, results above.    1918 The patient was sent for a CT while in the emergency department, results above.     2050 I returned to check on patient.  I removed the patient's Cspine.    2135 I personally reviewed the laboratory and imaging results with the patient and answered all related questions prior to discharge.    Impression & Plan      Medical Decision Making:  Ruslan Cervantes is a 54 year old male who presents to the emergency department today for evaluation of a head injury and fall down  stairs and neck and back pain. Patient with significant loss of consciousness and was slightly confused upon arrival therefore CT head obtained. This was negative. Suspect concussion.  Mental status cleared completely in ED.  May have been side effect of pre-hospital pain medications. Patient underwent full trauma evaluation, no discreet injury was noted. Following imaging, cervical spine was clinically cleared. Patient was feeling better after the above interventions and was able to ambulate throughout the emergency department. Discussed concussion head injury signs and symptoms and recommended outpatient follow up. Norco and robaxin PRN, no work until cleared by primary care physician in 2-3 days. Discharged home.     Diagnosis:    ICD-10-CM    1. Contusion of scalp, initial encounter S00.03XA    2. Strain of lumbar region, initial encounter S39.012A    3. Strain of neck muscle, initial encounter S16.1XXA      Disposition:   Findings and plan explained to the Patient. Patient discharged home with instructions regarding supportive care, medications, and reasons to return. The importance of close follow-up was reviewed. The patient was prescribed Norco and Robaxin.     Discharge Medications:  START taking      Dose / Directions   HYDROcodone-acetaminophen 5-325 MG tablet  Commonly known as:  NORCO      Dose:  1-2 tablet  Take 1-2 tablets by mouth every 6 hours as needed for pain  Quantity:  10 tablet  Refills:  0     methocarbamol 750 MG tablet  Commonly known as:  ROBAXIN      Dose:  750 mg  Take 1 tablet (750 mg) by mouth 3 times daily as needed for muscle spasms  Quantity:  30 tablet  Refills:  0       Scribe Disclosure:  Brittnee CUNNINGHAM, am serving as a scribe at 8:11 PM on 10/9/2019 to document services personally performed by Kendra Jane MD based on my observations and the provider's statements to me.    [facility] EMERGENCY DEPARTMENT       Kendra Jane,  MD  10/10/19 2506

## 2019-10-10 NOTE — DISCHARGE INSTRUCTIONS
Take ibuprofen 600 mg 3x per day.  This will provide both pain control and fight against inflammation.   You were given a prescription for Robaxin.  This is a muscle relaxant medication.  Use this as needed for additional pain control.  You were given a prescription for pain medication.  Use this if ibuprofen and flexeril are not working.      Discharge Instructions  Back Pain  You were seen today for back pain. Back pain can have many causes, but most will get better without surgery or other specific treatment. Sometimes there is a herniated ( slipped ) disc. We do not usually do MRI scans to look for these right away, since most herniated discs will get better on their own with time.  Today, we did not find any evidence that your back pain was caused by a serious condition. However, sometimes symptoms develop over time and cannot be found during an emergency visit, so it is very important that you follow up with your primary provider.  Generally, every Emergency Department visit should have a follow-up clinic visit with either a primary or a specialty clinic/provider. Please follow-up as instructed by your emergency provider today.    Return to the Emergency Department if:  You develop a fever with your back pain.   You have weakness or change in sensation in one or both legs.  You lose control of your bowels or bladder, or cannot empty your bladder (cannot pee).  Your pain gets much worse.     Follow-up with your provider:  Unless your pain has completely gone away, please make an appointment with your provider within one week. Most of the routine care for back pain is available in a clinic and not the Emergency Department. You may need further management of your back pain, such as more pain medication, imaging such as an X-ray or MRI, or physical therapy.    What can I do to help myself?  Remain Active -- People are often afraid that they will hurt their back further or delay recovery by remaining active, but  this is one of the best things you can do for your back. In fact, staying in bed for a long time to rest is not recommended. Studies have shown that people with low back pain recover faster when they remain active. Movement helps to bring blood flow to the muscles and relieve muscle spasms as well as preventing loss of muscle strength.  Heat -- Using a heating pad can help with low back pain during the first few weeks. Do not sleep with a heating pad, as you can be burned.   Pain medications - You may take a pain medication such as Tylenol  (acetaminophen), Advil , Motrin  (ibuprofen) or Aleve  (naproxen).  If you were given a prescription for medicine here today, be sure to read all of the information (including the package insert) that comes with your prescription.  This will include important information about the medicine, its side effects, and any warnings that you need to know about.  The pharmacist who fills the prescription can provide more information and answer questions you may have about the medicine.  If you have questions or concerns that the pharmacist cannot address, please call or return to the Emergency Department.   Remember that you can always come back to the Emergency Department if you are not able to see your regular provider in the amount of time listed above, if you get any new symptoms, or if there is anything that worries you      Discharge Instructions  Head Injury    You have been seen today for a head injury. Your evaluation included a history and physical examination. You may have had a CT (CAT) scan performed, though most head injuries do not require a scan. Based on this evaluation, your provider today does not feel that your head injury is serious.    Generally, every Emergency Department visit should have a follow-up clinic visit with either a primary or a specialty clinic/provider. Please follow-up as instructed by your emergency provider today.  Return to the Emergency Department  if:  You are confused or you are not acting right.  Your headache gets worse or you start to have a really bad headache even with your recommended treatment plan.  You vomit (throw up) more than once.  You have a seizure.  You have trouble walking.  You have weakness or paralysis (cannot move) in an arm or a leg.  You have blood or fluid coming from your ears or nose.  You have new symptoms or anything that worries you.    Sleeping:  It is okay for you to sleep, but someone should wake you up if instructed by your provider, and someone should check on you at your usual time to wake up.     Activity:  Do not drive for at least 24 hours.  Do not drive if you have dizzy spells or trouble concentrating, or remembering things.  Do not return to any contact sports until cleared by your regular provider.     MORE INFORMATION:    Concussion:  A concussion is a minor head injury that may cause temporary problems with the way the brain works. Although concussions are important, they are generally not an emergency or a reason that a person needs to be hospitalized. Some concussion symptoms include confusion, amnesia (forgetful), nausea (sick to your stomach) and vomiting (throwing up), dizziness, fatigue, memory or concentration problems, irritability and sleep problems. For most people, concussions are mild and temporary but some will have more severe and persistent symptoms that require on-going care and treatment.  CT Scans: Your evaluation today may have included a CT scan (CAT scan) to look for things like bleeding or a skull fracture (broken bone).  CT scans involve radiation and too many CT scans can cause serious health problems like cancer, especially in children.  Because of this, your provider may not have ordered a CT scan today if they think you are at low risk for a serious or life threatening problem.    If you were given a prescription for medicine here today, be sure to read all of the information (including  the package insert) that comes with your prescription.  This will include important information about the medicine, its side effects, and any warnings that you need to know about.  The pharmacist who fills the prescription can provide more information and answer questions you may have about the medicine.  If you have questions or concerns that the pharmacist cannot address, please call or return to the Emergency Department.     Remember that you can always come back to the Emergency Department if you are not able to see your regular provider in the amount of time listed above, if you get any new symptoms, or if there is anything that worries you.  .

## 2019-11-21 ENCOUNTER — TELEPHONE (OUTPATIENT)
Dept: FAMILY MEDICINE | Facility: CLINIC | Age: 54
End: 2019-11-21

## 2019-11-21 NOTE — TELEPHONE ENCOUNTER
Denied refill of atorvastatin, ciclopirox, metformin,bupropion and lisinopril. Pt needs OV for refills, last refilled on 10/23/19 per pharmacy.

## 2022-07-29 ENCOUNTER — VIRTUAL VISIT (OUTPATIENT)
Dept: URGENT CARE | Facility: CLINIC | Age: 57
End: 2022-07-29

## 2022-07-29 DIAGNOSIS — Z53.9 ERRONEOUS ENCOUNTER--DISREGARD: Primary | ICD-10-CM

## 2022-12-04 ENCOUNTER — APPOINTMENT (OUTPATIENT)
Dept: CT IMAGING | Facility: CLINIC | Age: 57
End: 2022-12-04
Attending: EMERGENCY MEDICINE
Payer: COMMERCIAL

## 2022-12-04 ENCOUNTER — HOSPITAL ENCOUNTER (EMERGENCY)
Facility: CLINIC | Age: 57
Discharge: HOME | End: 2022-12-04
Attending: PHYSICIAN ASSISTANT | Admitting: PHYSICIAN ASSISTANT
Payer: COMMERCIAL

## 2022-12-04 VITALS
RESPIRATION RATE: 16 BRPM | HEART RATE: 76 BPM | OXYGEN SATURATION: 98 % | DIASTOLIC BLOOD PRESSURE: 91 MMHG | SYSTOLIC BLOOD PRESSURE: 113 MMHG | TEMPERATURE: 98.9 F

## 2022-12-04 DIAGNOSIS — K40.90 NON-RECURRENT UNILATERAL INGUINAL HERNIA WITHOUT OBSTRUCTION OR GANGRENE: ICD-10-CM

## 2022-12-04 LAB
ALBUMIN UR-MCNC: NEGATIVE MG/DL
ANION GAP SERPL CALCULATED.3IONS-SCNC: 11 MMOL/L (ref 7–15)
APPEARANCE UR: CLEAR
BASOPHILS # BLD AUTO: 0.1 10E3/UL (ref 0–0.2)
BASOPHILS NFR BLD AUTO: 1 %
BILIRUB UR QL STRIP: NEGATIVE
BUN SERPL-MCNC: 25.2 MG/DL (ref 6–20)
CALCIUM SERPL-MCNC: 10.2 MG/DL (ref 8.6–10)
CHLORIDE SERPL-SCNC: 100 MMOL/L (ref 98–107)
COLOR UR AUTO: NORMAL
CREAT SERPL-MCNC: 0.91 MG/DL (ref 0.67–1.17)
DEPRECATED HCO3 PLAS-SCNC: 25 MMOL/L (ref 22–29)
EOSINOPHIL # BLD AUTO: 0.1 10E3/UL (ref 0–0.7)
EOSINOPHIL NFR BLD AUTO: 1 %
ERYTHROCYTE [DISTWIDTH] IN BLOOD BY AUTOMATED COUNT: 12.1 % (ref 10–15)
GFR SERPL CREATININE-BSD FRML MDRD: >90 ML/MIN/1.73M2
GLUCOSE SERPL-MCNC: 107 MG/DL (ref 70–99)
GLUCOSE UR STRIP-MCNC: NEGATIVE MG/DL
HCT VFR BLD AUTO: 43.4 % (ref 40–53)
HGB BLD-MCNC: 14.6 G/DL (ref 13.3–17.7)
HGB UR QL STRIP: NEGATIVE
IMM GRANULOCYTES # BLD: 0 10E3/UL
IMM GRANULOCYTES NFR BLD: 0 %
KETONES UR STRIP-MCNC: NEGATIVE MG/DL
LEUKOCYTE ESTERASE UR QL STRIP: NEGATIVE
LYMPHOCYTES # BLD AUTO: 2.1 10E3/UL (ref 0.8–5.3)
LYMPHOCYTES NFR BLD AUTO: 35 %
MCH RBC QN AUTO: 33 PG (ref 26.5–33)
MCHC RBC AUTO-ENTMCNC: 33.6 G/DL (ref 31.5–36.5)
MCV RBC AUTO: 98 FL (ref 78–100)
MONOCYTES # BLD AUTO: 0.6 10E3/UL (ref 0–1.3)
MONOCYTES NFR BLD AUTO: 9 %
NEUTROPHILS # BLD AUTO: 3.2 10E3/UL (ref 1.6–8.3)
NEUTROPHILS NFR BLD AUTO: 54 %
NITRATE UR QL: NEGATIVE
NRBC # BLD AUTO: 0 10E3/UL
NRBC BLD AUTO-RTO: 0 /100
PH UR STRIP: 7 [PH] (ref 5–7)
PLATELET # BLD AUTO: 208 10E3/UL (ref 150–450)
POTASSIUM SERPL-SCNC: 4.3 MMOL/L (ref 3.4–5.3)
RBC # BLD AUTO: 4.42 10E6/UL (ref 4.4–5.9)
RBC URINE: 1 /HPF
SODIUM SERPL-SCNC: 136 MMOL/L (ref 136–145)
SP GR UR STRIP: 1 (ref 1–1.03)
UROBILINOGEN UR STRIP-MCNC: NORMAL MG/DL
WBC # BLD AUTO: 6 10E3/UL (ref 4–11)
WBC URINE: <1 /HPF

## 2022-12-04 PROCEDURE — 96375 TX/PRO/DX INJ NEW DRUG ADDON: CPT

## 2022-12-04 PROCEDURE — 250N000009 HC RX 250: Performed by: PHYSICIAN ASSISTANT

## 2022-12-04 PROCEDURE — 36415 COLL VENOUS BLD VENIPUNCTURE: CPT | Performed by: EMERGENCY MEDICINE

## 2022-12-04 PROCEDURE — 74177 CT ABD & PELVIS W/CONTRAST: CPT

## 2022-12-04 PROCEDURE — 80048 BASIC METABOLIC PNL TOTAL CA: CPT | Performed by: EMERGENCY MEDICINE

## 2022-12-04 PROCEDURE — 85025 COMPLETE CBC W/AUTO DIFF WBC: CPT | Performed by: EMERGENCY MEDICINE

## 2022-12-04 PROCEDURE — 81001 URINALYSIS AUTO W/SCOPE: CPT | Performed by: EMERGENCY MEDICINE

## 2022-12-04 PROCEDURE — 250N000011 HC RX IP 250 OP 636: Performed by: PHYSICIAN ASSISTANT

## 2022-12-04 PROCEDURE — 96374 THER/PROPH/DIAG INJ IV PUSH: CPT | Mod: 59

## 2022-12-04 PROCEDURE — 99285 EMERGENCY DEPT VISIT HI MDM: CPT | Mod: 25

## 2022-12-04 PROCEDURE — 250N000011 HC RX IP 250 OP 636: Performed by: EMERGENCY MEDICINE

## 2022-12-04 RX ORDER — ONDANSETRON 2 MG/ML
4 INJECTION INTRAMUSCULAR; INTRAVENOUS ONCE
Status: COMPLETED | OUTPATIENT
Start: 2022-12-04 | End: 2022-12-04

## 2022-12-04 RX ORDER — MORPHINE SULFATE 4 MG/ML
4 INJECTION, SOLUTION INTRAMUSCULAR; INTRAVENOUS ONCE
Status: COMPLETED | OUTPATIENT
Start: 2022-12-04 | End: 2022-12-04

## 2022-12-04 RX ORDER — IOPAMIDOL 755 MG/ML
500 INJECTION, SOLUTION INTRAVASCULAR ONCE
Status: COMPLETED | OUTPATIENT
Start: 2022-12-04 | End: 2022-12-04

## 2022-12-04 RX ORDER — KETOROLAC TROMETHAMINE 15 MG/ML
15 INJECTION, SOLUTION INTRAMUSCULAR; INTRAVENOUS ONCE
Status: COMPLETED | OUTPATIENT
Start: 2022-12-04 | End: 2022-12-04

## 2022-12-04 RX ADMIN — SODIUM CHLORIDE 59 ML: 9 INJECTION, SOLUTION INTRAVENOUS at 17:01

## 2022-12-04 RX ADMIN — IOPAMIDOL 74 ML: 755 INJECTION, SOLUTION INTRAVENOUS at 17:01

## 2022-12-04 RX ADMIN — MORPHINE SULFATE 4 MG: 4 INJECTION, SOLUTION INTRAMUSCULAR; INTRAVENOUS at 17:37

## 2022-12-04 RX ADMIN — KETOROLAC TROMETHAMINE 15 MG: 15 INJECTION, SOLUTION INTRAMUSCULAR; INTRAVENOUS at 16:31

## 2022-12-04 RX ADMIN — ONDANSETRON 4 MG: 2 INJECTION INTRAMUSCULAR; INTRAVENOUS at 16:31

## 2022-12-04 ASSESSMENT — ENCOUNTER SYMPTOMS
HEMATURIA: 0
DIARRHEA: 0
BACK PAIN: 0
FREQUENCY: 0
VOMITING: 0
NAUSEA: 1
RECTAL PAIN: 0
DIFFICULTY URINATING: 0
ABDOMINAL PAIN: 1
CONSTIPATION: 0

## 2022-12-04 ASSESSMENT — ACTIVITIES OF DAILY LIVING (ADL)
ADLS_ACUITY_SCORE: 35
ADLS_ACUITY_SCORE: 35

## 2022-12-04 NOTE — ED PROVIDER NOTES
History     Chief Complaint:  Hernia    HPI   Ruslan Cervantes is a 57 year old male with abdominal discomfort that started 2 days ago.  He reports he first felt the sensation after he was going to the bathroom and it felt like he was stepping on a grape.  Over the last 2 days it feels like the area increased in size and he was lifting today and he felt that a large bulge pop out.  He had pain immediately but was able to reduce it and pain improved.  He reports some nausea and bloating but denies any vomiting, hematuria or bowel changes.  No history of hernia in the past.  No testicular pain.      ROS:  Review of Systems   Gastrointestinal: Positive for abdominal pain and nausea. Negative for constipation, diarrhea, rectal pain and vomiting.   Genitourinary: Negative for difficulty urinating, frequency, hematuria, scrotal swelling and testicular pain.   Musculoskeletal: Negative for back pain.   All other systems reviewed and are negative.    Allergies:  No Known Allergies     Medications:    atorvastatin (LIPITOR) 10 MG tablet  buPROPion (WELLBUTRIN XL) 150 MG 24 hr tablet  ciclopirox (PENLAC) 8 % external solution  lisinopril (PRINIVIL/ZESTRIL) 5 MG tablet  metFORMIN (GLUCOPHAGE-XR) 500 MG 24 hr tablet  methocarbamol (ROBAXIN) 750 MG tablet  sildenafil (VIAGRA) 25 MG tablet        Past Medical History:    Past Medical History:   Diagnosis Date     Back injury 2016     Patient Active Problem List   Diagnosis     ACP (advance care planning)     Health Care Home     Diabetes mellitus, type 2 (H)        Past Surgical History:    Past Surgical History:   Procedure Laterality Date     APPENDECTOMY  1983     BACK SURGERY N/A 10/17/2016    L4, L5 and S1 lumbar fusion surgery     HC OPEN TX NOSE FX+OPEN FIX SEPTUM  1983    deviated septum     HC TOOTH EXTRACTION W/FORCEP  1983    all wisdom teeth removed     HERNIA REPAIR  1975        Family History:    family history includes Depression in his maternal grandmother,  mother, and another family member.    Social History:  PCP: Evelia Díaz   Presents alone to the ED    Physical Exam     Patient Vitals for the past 24 hrs:   BP Temp Temp src Pulse Resp SpO2   12/04/22 1559 (!) 113/91 98.9  F (37.2  C) Temporal 76 16 98 %        Physical Exam  Vitals and nursing note reviewed.   Constitutional:       Appearance: He is not diaphoretic.   HENT:      Nose: Nose normal.   Eyes:      General: No scleral icterus.     Conjunctiva/sclera: Conjunctivae normal.   Cardiovascular:      Rate and Rhythm: Normal rate and regular rhythm.      Heart sounds: No murmur heard.    No friction rub. No gallop.   Pulmonary:      Effort: Pulmonary effort is normal. No respiratory distress.      Breath sounds: Normal breath sounds. No wheezing or rales.   Abdominal:      General: There is no distension.      Palpations: Abdomen is soft.      Tenderness: There is no abdominal tenderness. There is no right CVA tenderness, left CVA tenderness, guarding or rebound.      Hernia: A hernia is present.             Emergency Department Course       Imaging:  Abd/pelvis CT,  IV  contrast only TRAUMA / AAA   Final Result   IMPRESSION:    1.  No evidence for inguinal hernia. No bowel obstruction.      2.  Lumbar spine postsurgical change.      3.  Hepatic and right renal simple cyst. No follow-up is needed.         Report per radiology    Laboratory:  Labs Ordered and Resulted from Time of ED Arrival to Time of ED Departure   BASIC METABOLIC PANEL - Abnormal       Result Value    Sodium 136      Potassium 4.3      Chloride 100      Carbon Dioxide (CO2) 25      Anion Gap 11      Urea Nitrogen 25.2 (*)     Creatinine 0.91      Calcium 10.2 (*)     Glucose 107 (*)     GFR Estimate >90     ROUTINE UA WITH MICROSCOPIC - Normal    Color Urine Light Yellow      Appearance Urine Clear      Glucose Urine Negative      Bilirubin Urine Negative      Ketones Urine Negative      Specific Gravity Urine 1.005      Blood  Urine Negative      pH Urine 7.0      Protein Albumin Urine Negative      Urobilinogen Urine Normal      Nitrite Urine Negative      Leukocyte Esterase Urine Negative      RBC Urine 1      WBC Urine <1     CBC WITH PLATELETS AND DIFFERENTIAL    WBC Count 6.0      RBC Count 4.42      Hemoglobin 14.6      Hematocrit 43.4      MCV 98      MCH 33.0      MCHC 33.6      RDW 12.1      Platelet Count 208      % Neutrophils 54      % Lymphocytes 35      % Monocytes 9      % Eosinophils 1      % Basophils 1      % Immature Granulocytes 0      NRBCs per 100 WBC 0      Absolute Neutrophils 3.2      Absolute Lymphocytes 2.1      Absolute Monocytes 0.6      Absolute Eosinophils 0.1      Absolute Basophils 0.1      Absolute Immature Granulocytes 0.0      Absolute NRBCs 0.0          Procedures     Emergency Department Course:         Reviewed:  I reviewed nursing notes, vitals, past medical history and Care Everywhere    Assessments/Consults:        I spoke with Dr. Smith on-call for general surgery.    Interventions:  Medications   ketorolac (TORADOL) injection 15 mg (15 mg Intravenous Given 12/4/22 1631)   ondansetron (ZOFRAN) injection 4 mg (4 mg Intravenous Given 12/4/22 1631)   morphine (PF) injection 4 mg (4 mg Intravenous Given 12/4/22 1737)   iopamidol (ISOVUE-370) solution 500 mL (74 mLs Intravenous Given 12/4/22 1701)   for CT scan flush use (59 mLs Intravenous Given 12/4/22 1701)        Disposition:  The patient was discharged to home.     Impression & Plan    CMS Diagnoses: None      Medical Decision Making:    This is a 57-year-old male that presents to the emergency department for a cute onset of bulging and tenderness of his abdomen concerning for hernia.  On physical exam it is evident that he has a hernia near his inguinal area on the right side.  This hernia is more evident when he is standing up or coughing.  It has been reduced multiple times here in the ED.  He reports some mild pain but pain relieved with  reduction and pain controlling medications.  CT imaging was obtained to try to delineate this hernia further however it is not readily apparent likely because when he is lying flat the hernia reduces very well.  I spoke with on-call general surgeon who reviewed the CT findings and does feel there is some evidence to suggest hernia consistent with my exam.  He recommends the patient follow-up closely this week on an outpatient basis.  Given there is no evidence of strangulation or incarceration today the patient does not require admission to the hospital.  Patient is agreeable to this plan.  He was given abdominal binder and is instructed not to do any heavy lifting pushing pulling climbing kneeling or squatting and will likely require time off from work.  He was instructed that if the hernia pops out again and cannot be reduced becomes more painful he develops any severe constipation or vomiting or increasing abdominal pain to return back to the emergency department.    My impression of today's diagnosis is:     ICD-10-CM    1. Non-recurrent unilateral inguinal hernia without obstruction or gangrene  K40.90           Discharge Medications:  Discharge Medication List as of 12/4/2022  8:14 PM           12/4/2022   Quincy Morales PA-C Kruger, Jacob C, PA-C  12/04/22 7620

## 2022-12-04 NOTE — ED TRIAGE NOTES
"Pt states that 2 days ago he was moving things around and he felt something that felt like \"squashing a grape\" in his inguinal area. Pt states that today he has a bulging \"lime-sized\" mass in his groin. Pt states that he cannot walk due to pain. Pt visibly uncomfortable.       "

## 2022-12-04 NOTE — LETTER
Ruslan Cervantes was seen and treated in our emergency department on 12/4/2022.  He may return to work on 12/09/2022.  Restrictions: Patient cannot push, pull, lifting, climb or squat or kneel until cleared by General surgery     If you have any questions or concerns, please don't hesitate to call.      Quincy Morales, TERESA

## 2022-12-04 NOTE — ED NOTES
"Rapid Assessment Note    History:   Bipin Cervantes is a 57 year old male with abdominal pain. When he left for work there was no pain. He felt a sensation like \"stepping on a grape\". He had to keep peeing. He has a bulging area to the right of his groin that is growing. Sitting still is the only way to feel less pain. He popped it back in and the pain was completely relieved, but it popped back out and was more painful. Notes nausea and bloating. He was moving heavy things this weekend. Denies vomiting, hematuria, bowel changes.    Exam:       Plan of Care:   I evaluated the patient and developed an initial plan of care. I discussed this plan and explained that I, or one of my partners, would be returning to complete the evaluation.     I, Michaela Kent, am serving as a scribe to document services personally performed by Felice Deshpande MD, based on my observations and the provider's statements to me.    11/5/2018  [employer]    Portions of this medical record were completed by a scribe. UPON MY REVIEW AND AUTHENTICATION BY ELECTRONIC SIGNATURE, this confirms (a) I performed the applicable clinical services, and (b) the record is accurate.     "

## 2023-02-16 ENCOUNTER — HOSPITAL ENCOUNTER (EMERGENCY)
Facility: CLINIC | Age: 58
Discharge: HOME | End: 2023-02-16
Attending: EMERGENCY MEDICINE | Admitting: EMERGENCY MEDICINE
Payer: COMMERCIAL

## 2023-02-16 ENCOUNTER — APPOINTMENT (OUTPATIENT)
Dept: CT IMAGING | Facility: CLINIC | Age: 58
End: 2023-02-16
Attending: EMERGENCY MEDICINE
Payer: COMMERCIAL

## 2023-02-16 VITALS
DIASTOLIC BLOOD PRESSURE: 67 MMHG | HEART RATE: 78 BPM | OXYGEN SATURATION: 96 % | TEMPERATURE: 97.8 F | RESPIRATION RATE: 29 BRPM | SYSTOLIC BLOOD PRESSURE: 122 MMHG

## 2023-02-16 DIAGNOSIS — R10.84 ABDOMINAL PAIN, GENERALIZED: ICD-10-CM

## 2023-02-16 DIAGNOSIS — K56.0 PARALYTIC ILEUS (H): ICD-10-CM

## 2023-02-16 LAB
ALBUMIN SERPL BCG-MCNC: 4.5 G/DL (ref 3.5–5.2)
ALP SERPL-CCNC: 46 U/L (ref 40–129)
ALT SERPL W P-5'-P-CCNC: 28 U/L (ref 10–50)
ANION GAP SERPL CALCULATED.3IONS-SCNC: 9 MMOL/L (ref 7–15)
AST SERPL W P-5'-P-CCNC: 24 U/L (ref 10–50)
BASOPHILS # BLD AUTO: 0.1 10E3/UL (ref 0–0.2)
BASOPHILS NFR BLD AUTO: 1 %
BILIRUB SERPL-MCNC: 0.4 MG/DL
BUN SERPL-MCNC: 21.5 MG/DL (ref 6–20)
CALCIUM SERPL-MCNC: 9.8 MG/DL (ref 8.6–10)
CHLORIDE SERPL-SCNC: 101 MMOL/L (ref 98–107)
CREAT SERPL-MCNC: 0.85 MG/DL (ref 0.67–1.17)
DEPRECATED HCO3 PLAS-SCNC: 25 MMOL/L (ref 22–29)
EOSINOPHIL # BLD AUTO: 0.1 10E3/UL (ref 0–0.7)
EOSINOPHIL NFR BLD AUTO: 1 %
ERYTHROCYTE [DISTWIDTH] IN BLOOD BY AUTOMATED COUNT: 11.8 % (ref 10–15)
GFR SERPL CREATININE-BSD FRML MDRD: >90 ML/MIN/1.73M2
GLUCOSE SERPL-MCNC: 151 MG/DL (ref 70–99)
HCT VFR BLD AUTO: 43.4 % (ref 40–53)
HGB BLD-MCNC: 15 G/DL (ref 13.3–17.7)
HOLD SPECIMEN: NORMAL
IMM GRANULOCYTES # BLD: 0 10E3/UL
IMM GRANULOCYTES NFR BLD: 0 %
LACTATE SERPL-SCNC: 0.7 MMOL/L (ref 0.7–2)
LIPASE SERPL-CCNC: 38 U/L (ref 13–60)
LYMPHOCYTES # BLD AUTO: 1.5 10E3/UL (ref 0.8–5.3)
LYMPHOCYTES NFR BLD AUTO: 18 %
MCH RBC QN AUTO: 34.1 PG (ref 26.5–33)
MCHC RBC AUTO-ENTMCNC: 34.6 G/DL (ref 31.5–36.5)
MCV RBC AUTO: 99 FL (ref 78–100)
MONOCYTES # BLD AUTO: 0.7 10E3/UL (ref 0–1.3)
MONOCYTES NFR BLD AUTO: 9 %
NEUTROPHILS # BLD AUTO: 5.6 10E3/UL (ref 1.6–8.3)
NEUTROPHILS NFR BLD AUTO: 71 %
NRBC # BLD AUTO: 0 10E3/UL
NRBC BLD AUTO-RTO: 0 /100
PLATELET # BLD AUTO: 258 10E3/UL (ref 150–450)
POTASSIUM SERPL-SCNC: 4.5 MMOL/L (ref 3.4–5.3)
PROT SERPL-MCNC: 6.9 G/DL (ref 6.4–8.3)
RBC # BLD AUTO: 4.4 10E6/UL (ref 4.4–5.9)
SODIUM SERPL-SCNC: 135 MMOL/L (ref 136–145)
WBC # BLD AUTO: 8 10E3/UL (ref 4–11)

## 2023-02-16 PROCEDURE — 83690 ASSAY OF LIPASE: CPT | Performed by: EMERGENCY MEDICINE

## 2023-02-16 PROCEDURE — 85004 AUTOMATED DIFF WBC COUNT: CPT | Performed by: EMERGENCY MEDICINE

## 2023-02-16 PROCEDURE — 96376 TX/PRO/DX INJ SAME DRUG ADON: CPT

## 2023-02-16 PROCEDURE — 83605 ASSAY OF LACTIC ACID: CPT | Performed by: EMERGENCY MEDICINE

## 2023-02-16 PROCEDURE — 74177 CT ABD & PELVIS W/CONTRAST: CPT

## 2023-02-16 PROCEDURE — 96361 HYDRATE IV INFUSION ADD-ON: CPT

## 2023-02-16 PROCEDURE — 80053 COMPREHEN METABOLIC PANEL: CPT | Performed by: EMERGENCY MEDICINE

## 2023-02-16 PROCEDURE — 250N000011 HC RX IP 250 OP 636: Performed by: EMERGENCY MEDICINE

## 2023-02-16 PROCEDURE — 96375 TX/PRO/DX INJ NEW DRUG ADDON: CPT

## 2023-02-16 PROCEDURE — 99285 EMERGENCY DEPT VISIT HI MDM: CPT | Mod: 25

## 2023-02-16 PROCEDURE — 250N000009 HC RX 250: Performed by: EMERGENCY MEDICINE

## 2023-02-16 PROCEDURE — 96374 THER/PROPH/DIAG INJ IV PUSH: CPT | Mod: 59

## 2023-02-16 PROCEDURE — 258N000003 HC RX IP 258 OP 636: Performed by: EMERGENCY MEDICINE

## 2023-02-16 PROCEDURE — 36415 COLL VENOUS BLD VENIPUNCTURE: CPT | Performed by: EMERGENCY MEDICINE

## 2023-02-16 RX ORDER — HYDROMORPHONE HYDROCHLORIDE 1 MG/ML
0.5 INJECTION, SOLUTION INTRAMUSCULAR; INTRAVENOUS; SUBCUTANEOUS
Status: DISCONTINUED | OUTPATIENT
Start: 2023-02-16 | End: 2023-02-16 | Stop reason: HOSPADM

## 2023-02-16 RX ORDER — ONDANSETRON 2 MG/ML
4 INJECTION INTRAMUSCULAR; INTRAVENOUS EVERY 30 MIN PRN
Status: DISCONTINUED | OUTPATIENT
Start: 2023-02-16 | End: 2023-02-16 | Stop reason: HOSPADM

## 2023-02-16 RX ORDER — SODIUM CHLORIDE 9 MG/ML
INJECTION, SOLUTION INTRAVENOUS CONTINUOUS
Status: DISCONTINUED | OUTPATIENT
Start: 2023-02-16 | End: 2023-02-16 | Stop reason: HOSPADM

## 2023-02-16 RX ORDER — IOPAMIDOL 755 MG/ML
74 INJECTION, SOLUTION INTRAVASCULAR ONCE
Status: COMPLETED | OUTPATIENT
Start: 2023-02-16 | End: 2023-02-16

## 2023-02-16 RX ORDER — HYDROCODONE BITARTRATE AND ACETAMINOPHEN 5; 325 MG/1; MG/1
1-2 TABLET ORAL EVERY 4 HOURS PRN
Qty: 10 TABLET | Refills: 0 | Status: SHIPPED | OUTPATIENT
Start: 2023-02-16

## 2023-02-16 RX ADMIN — SODIUM CHLORIDE 1000 ML: 9 INJECTION, SOLUTION INTRAVENOUS at 09:37

## 2023-02-16 RX ADMIN — ONDANSETRON 4 MG: 2 INJECTION INTRAMUSCULAR; INTRAVENOUS at 09:38

## 2023-02-16 RX ADMIN — IOPAMIDOL 74 ML: 755 INJECTION, SOLUTION INTRAVENOUS at 10:45

## 2023-02-16 RX ADMIN — HYDROMORPHONE HYDROCHLORIDE 0.5 MG: 1 INJECTION, SOLUTION INTRAMUSCULAR; INTRAVENOUS; SUBCUTANEOUS at 09:38

## 2023-02-16 RX ADMIN — SODIUM CHLORIDE 61 ML: 900 INJECTION INTRAVENOUS at 10:46

## 2023-02-16 RX ADMIN — HYDROMORPHONE HYDROCHLORIDE 0.5 MG: 1 INJECTION, SOLUTION INTRAMUSCULAR; INTRAVENOUS; SUBCUTANEOUS at 10:07

## 2023-02-16 ASSESSMENT — ENCOUNTER SYMPTOMS
LIGHT-HEADEDNESS: 0
VOMITING: 0
HEMATURIA: 0
DIZZINESS: 0
NAUSEA: 0
FEVER: 0
DIAPHORESIS: 1
ABDOMINAL PAIN: 1
DYSURIA: 0

## 2023-02-16 ASSESSMENT — ACTIVITIES OF DAILY LIVING (ADL): ADLS_ACUITY_SCORE: 35

## 2023-02-16 NOTE — ED TRIAGE NOTES
Pt reports sudden onset of 10/10 abdominal pain that wraps around his back. Pt reports diaphoretic and nauseated with the pain.

## 2023-02-16 NOTE — ED PROVIDER NOTES
History     Chief Complaint:  Abdominal Pain       The history is provided by the patient.      Ruslan Cervantes is a 57 year old male with a history of hyperlipidemia and type 2 diabetes mellitus who presents with abdominal pain. The patient reports pain across the abdomen beginning about 1 hour ago. He describes the pain as sharp and achy, noting it feels like severe gas which he has never experienced before. This radiates around to the lower back bilaterally. The patient states he was already awake and had not eaten anything yet. He reports his last bowel movement around 0500 this morning, which was normal. The patient mentions diaphoresis when the abdominal pain began, but no fever. He denies nausea, vomiting, dysuria, hematuria, chest pain, lightheadedness, and dizziness. He confirms past appendectomy and hernia surgery, no other abdominal surgeries. Denies tobacco or alcohol use, noting he is an ex-smoker and recovering alcoholic, 18 years. The patient has not yet taken anything for the pain.     Independent Historian:   None - Patient Only    Review of External Notes: [facility] ED note 12/4/22    ROS:  Review of Systems   Constitutional: Positive for diaphoresis. Negative for fever.   Cardiovascular: Negative for chest pain.   Gastrointestinal: Positive for abdominal pain. Negative for nausea and vomiting.   Genitourinary: Negative for dysuria and hematuria.   Neurological: Negative for dizziness and light-headedness.   All other systems reviewed and are negative.      Allergies:  No Known Allergies     Medications:    Atorvastatin   Wellbutrin  Lisinopril  Metformin  Robaxin    Past Medical History:    Type 2 diabetes mellitus   Hyperlipidemia  Back injury   Inguinal hernia     Past Surgical History:    Appendectomy  L4, L5 and S1 lumbar fusion surgery  Deviated septum  Hernia repair     Family History:    Depression     Social History:  The patient presents alone.  Drove himself here, but can get a ride  home.   PCP: [facility]     Physical Exam     Patient Vitals for the past 24 hrs:   BP Temp Pulse Resp SpO2   02/16/23 1130 122/67 -- 78 29 --   02/16/23 1107 -- -- 59 11 96 %   02/16/23 1057 114/72 -- 57 11 97 %   02/16/23 1037 -- -- 59 11 96 %   02/16/23 1032 116/69 -- 60 11 95 %   02/16/23 1027 116/69 -- 82 23 95 %   02/16/23 1022 -- -- 79 11 98 %   02/16/23 1012 -- -- 64 11 95 %   02/16/23 1002 136/76 -- 56 15 98 %   02/16/23 1001 -- -- 55 13 97 %   02/16/23 1000 136/76 -- 57 16 98 %   02/16/23 0949 -- -- (!) 49 10 --   02/16/23 0934 (!) 136/98 -- 58 10 --   02/16/23 0924 (!) 182/81 97.8  F (36.6  C) 73 (!) 35 97 %        Physical Exam  Nursing note and vitals reviewed.  Constitutional:  Oriented to person, place, and time. Cooperative. Appears uncomfortable.   HENT:   Nose:    Nose normal.   Mouth/Throat:   Face mask in place.  Eyes:    Conjunctivae normal and EOM are normal.      Pupils are equal, round, and reactive to light.   Neck:    Trachea normal.   Cardiovascular:  Normal rate, regular rhythm, normal heart sounds and normal pulses. No murmur heard.  Pulmonary/Chest:  Effort normal and breath sounds normal.   Abdominal:   Soft. Normal appearance and bowel sounds are normal.      Tenderness to palpation across the upper abdomen. No pulsatile masses.     There is no rebound and no CVA tenderness.   Musculoskeletal:  Extremities atraumatic x 4.   Lymphadenopathy:  No cervical adenopathy.   Neurological:   Alert and oriented to person, place, and time. Normal strength.      No cranial nerve deficit or sensory deficit. GCS eye subscore is 4. GCS verbal subscore is 5. GCS motor subscore is 6.   Skin:    Skin is intact. No rash noted.   Psychiatric:   Normal mood and affect.     Emergency Department Course     Imaging:  CT Abdomen Pelvis w Contrast   Preliminary Result   IMPRESSION:    1.  A few fluid distended small bowel loops could relate to an   enteritis or potentially an ileus. There is  prominent stool within the   colon.   2.  No acute abnormality otherwise seen. Please note that the appendix   cannot be identified for assessment.      Report per radiology    Laboratory:  Labs Ordered and Resulted from Time of ED Arrival to Time of ED Departure   COMPREHENSIVE METABOLIC PANEL - Abnormal       Result Value    Sodium 135 (*)     Potassium 4.5      Chloride 101      Carbon Dioxide (CO2) 25      Anion Gap 9      Urea Nitrogen 21.5 (*)     Creatinine 0.85      Calcium 9.8      Glucose 151 (*)     Alkaline Phosphatase 46      AST 24      ALT 28      Protein Total 6.9      Albumin 4.5      Bilirubin Total 0.4      GFR Estimate >90     CBC WITH PLATELETS AND DIFFERENTIAL - Abnormal    WBC Count 8.0      RBC Count 4.40      Hemoglobin 15.0      Hematocrit 43.4      MCV 99      MCH 34.1 (*)     MCHC 34.6      RDW 11.8      Platelet Count 258      % Neutrophils 71      % Lymphocytes 18      % Monocytes 9      % Eosinophils 1      % Basophils 1      % Immature Granulocytes 0      NRBCs per 100 WBC 0      Absolute Neutrophils 5.6      Absolute Lymphocytes 1.5      Absolute Monocytes 0.7      Absolute Eosinophils 0.1      Absolute Basophils 0.1      Absolute Immature Granulocytes 0.0      Absolute NRBCs 0.0     LACTIC ACID WHOLE BLOOD - Normal    Lactic Acid 0.7     LIPASE - Normal    Lipase 38     ROUTINE UA WITH MICROSCOPIC REFLEX TO CULTURE        Emergency Department Course & Assessments:       Interventions:  Medications   0.9% sodium chloride BOLUS (0 mLs Intravenous Stopped 2/16/23 1107)     Followed by   sodium chloride 0.9% infusion (has no administration in time range)   ondansetron (ZOFRAN) injection 4 mg (4 mg Intravenous Given 2/16/23 0938)   HYDROmorphone (PF) (DILAUDID) injection 0.5 mg (0.5 mg Intravenous Given 2/16/23 1007)   Saline flush (61 mLs Intravenous Given 2/16/23 1046)   iopamidol (ISOVUE-370) solution 74 mL (74 mLs Intravenous Given 2/16/23 1045)        Independent Interpretation  (X-rays, CTs, rhythm strip):  None    Social Determinants of Health affecting care:   None    Assessments:  0926 I obtained history and examined the patient as noted above.   1131 I rechecked the patient and explained findings. At this point I feel that the patient is safe for discharge, and the patient agrees.     Disposition:  The patient was discharged to home.     Impression & Plan      Medical Decision Making:  This is a 57-year-old male came in for further evaluation of abdominal pain.  He was quite uncomfortable upon arrival here, although he did have a soft abdomen.  Nonetheless, I was concerned he might have a bowel obstruction or bowel ischemia, as well as possibly a AAA, peptic ulcer disease, or possibly gallbladder related causes.  I proceeded with the above work-up including the blood work and CT scan.  He was provided the above interventions as well.  His CT scan shows possibly an ileus, but no other acute findings.  His laboratory work-up is unremarkable as well, which is reassuring.  I had a long discussion with him about staying in the hospital versus going home.  He indicated that he has passed quite a bit of flatus here in the emergency department and is feeling better.  He prefers to try going home, which I think is reasonable.  I agreed to provide him a small prescription for Norco in case he has a significant increase in his pain.  He should follow-up with his physician and return here with any concerns or worsening symptoms as well.      Diagnosis:    ICD-10-CM    1. Abdominal pain, generalized  R10.84       2. Possible paralytic ileus (H)  K56.0            Discharge Medications:  New Prescriptions    HYDROCODONE-ACETAMINOPHEN (NORCO) 5-325 MG TABLET    Take 1-2 tablets by mouth every 4 hours as needed for pain        Scribe Disclosure:  I, Catalina Breaux, am serving as a scribe at 9:20 AM on 2/16/2023 to document services personally performed by Popeye Orta MD based on my observations  and the provider's statements to me.     2/16/2023   Popeye Orta MD Lashkowitz, Seth H, MD  02/16/23 [id number]

## 2024-06-17 PROBLEM — Z76.89 HEALTH CARE HOME: Status: RESOLVED | Noted: 2019-07-18 | Resolved: 2024-06-17
